# Patient Record
Sex: MALE | Race: WHITE | NOT HISPANIC OR LATINO | URBAN - METROPOLITAN AREA
[De-identification: names, ages, dates, MRNs, and addresses within clinical notes are randomized per-mention and may not be internally consistent; named-entity substitution may affect disease eponyms.]

---

## 2018-01-12 ENCOUNTER — OUTPATIENT (OUTPATIENT)
Dept: OUTPATIENT SERVICES | Facility: HOSPITAL | Age: 63
LOS: 1 days | End: 2018-01-12
Payer: COMMERCIAL

## 2018-01-12 VITALS
SYSTOLIC BLOOD PRESSURE: 109 MMHG | WEIGHT: 255.07 LBS | RESPIRATION RATE: 20 BRPM | DIASTOLIC BLOOD PRESSURE: 68 MMHG | TEMPERATURE: 98 F | HEIGHT: 72 IN | HEART RATE: 54 BPM | OXYGEN SATURATION: 96 %

## 2018-01-12 DIAGNOSIS — I35.1 NONRHEUMATIC AORTIC (VALVE) INSUFFICIENCY: ICD-10-CM

## 2018-01-12 DIAGNOSIS — R06.02 SHORTNESS OF BREATH: ICD-10-CM

## 2018-01-12 LAB
ALBUMIN SERPL ELPH-MCNC: 4 G/DL — SIGNIFICANT CHANGE UP (ref 3.3–5)
ALP SERPL-CCNC: 44 U/L — SIGNIFICANT CHANGE UP (ref 40–120)
ALT FLD-CCNC: 14 U/L RC — SIGNIFICANT CHANGE UP (ref 10–45)
ANION GAP SERPL CALC-SCNC: 7 MMOL/L — SIGNIFICANT CHANGE UP (ref 5–17)
AST SERPL-CCNC: 19 U/L — SIGNIFICANT CHANGE UP (ref 10–40)
BILIRUB SERPL-MCNC: 0.9 MG/DL — SIGNIFICANT CHANGE UP (ref 0.2–1.2)
BUN SERPL-MCNC: 25 MG/DL — HIGH (ref 7–23)
CALCIUM SERPL-MCNC: 9.2 MG/DL — SIGNIFICANT CHANGE UP (ref 8.4–10.5)
CHLORIDE SERPL-SCNC: 101 MMOL/L — SIGNIFICANT CHANGE UP (ref 96–108)
CO2 SERPL-SCNC: 31 MMOL/L — SIGNIFICANT CHANGE UP (ref 22–31)
CREAT SERPL-MCNC: 1.31 MG/DL — HIGH (ref 0.5–1.3)
GLUCOSE SERPL-MCNC: 100 MG/DL — HIGH (ref 70–99)
HCT VFR BLD CALC: 44.2 % — SIGNIFICANT CHANGE UP (ref 39–50)
HGB BLD-MCNC: 15.2 G/DL — SIGNIFICANT CHANGE UP (ref 13–17)
MCHC RBC-ENTMCNC: 31 PG — SIGNIFICANT CHANGE UP (ref 27–34)
MCHC RBC-ENTMCNC: 34.3 GM/DL — SIGNIFICANT CHANGE UP (ref 32–36)
MCV RBC AUTO: 90.4 FL — SIGNIFICANT CHANGE UP (ref 80–100)
PLATELET # BLD AUTO: 221 K/UL — SIGNIFICANT CHANGE UP (ref 150–400)
POTASSIUM SERPL-MCNC: 3.9 MMOL/L — SIGNIFICANT CHANGE UP (ref 3.5–5.3)
POTASSIUM SERPL-SCNC: 3.9 MMOL/L — SIGNIFICANT CHANGE UP (ref 3.5–5.3)
PROT SERPL-MCNC: 6.8 G/DL — SIGNIFICANT CHANGE UP (ref 6–8.3)
RBC # BLD: 4.89 M/UL — SIGNIFICANT CHANGE UP (ref 4.2–5.8)
RBC # FLD: 11.7 % — SIGNIFICANT CHANGE UP (ref 10.3–14.5)
SODIUM SERPL-SCNC: 139 MMOL/L — SIGNIFICANT CHANGE UP (ref 135–145)
WBC # BLD: 6.9 K/UL — SIGNIFICANT CHANGE UP (ref 3.8–10.5)
WBC # FLD AUTO: 6.9 K/UL — SIGNIFICANT CHANGE UP (ref 3.8–10.5)

## 2018-01-12 PROCEDURE — 85027 COMPLETE CBC AUTOMATED: CPT

## 2018-01-12 PROCEDURE — C1769: CPT

## 2018-01-12 PROCEDURE — 80053 COMPREHEN METABOLIC PANEL: CPT

## 2018-01-12 PROCEDURE — 93005 ELECTROCARDIOGRAM TRACING: CPT

## 2018-01-12 PROCEDURE — 93010 ELECTROCARDIOGRAM REPORT: CPT

## 2018-01-12 PROCEDURE — 93458 L HRT ARTERY/VENTRICLE ANGIO: CPT

## 2018-01-12 PROCEDURE — C1887: CPT

## 2018-01-12 PROCEDURE — 93567 NJX CAR CTH SPRVLV AORTGRPHY: CPT

## 2018-01-12 PROCEDURE — C1894: CPT

## 2018-01-12 RX ORDER — SODIUM CHLORIDE 9 MG/ML
3 INJECTION INTRAMUSCULAR; INTRAVENOUS; SUBCUTANEOUS EVERY 8 HOURS
Qty: 0 | Refills: 0 | Status: DISCONTINUED | OUTPATIENT
Start: 2018-01-12 | End: 2018-01-27

## 2018-01-12 RX ORDER — ETANERCEPT 25 MG
50 VIAL (EA) SUBCUTANEOUS
Qty: 0 | Refills: 0 | COMMUNITY

## 2018-01-12 RX ORDER — LISINOPRIL 2.5 MG/1
1 TABLET ORAL
Qty: 0 | Refills: 0 | COMMUNITY

## 2018-01-12 RX ORDER — ASPIRIN/CALCIUM CARB/MAGNESIUM 324 MG
1 TABLET ORAL
Qty: 0 | Refills: 0 | COMMUNITY

## 2018-01-12 RX ORDER — ATORVASTATIN CALCIUM 80 MG/1
1 TABLET, FILM COATED ORAL
Qty: 0 | Refills: 0 | COMMUNITY

## 2018-01-12 NOTE — H&P CARDIOLOGY - HISTORY OF PRESENT ILLNESS
y/o  male with PMHx of HTN, HLD, Psoriasis, now  MPI done on 12/18/17 reveals small- mod size mild - mod intensity partially reversible perfusion defect involving the left ventricular cavity echo d one on 12/18/17 demonstrates Normal LV size & funx, Grade I diastolic dysfunction, LVH, mild AR, atrial septal aneurysm, dilated AR & ascending aorta, seen & evaluated by cardiologist & now recommends for cardiac cath. 61y/o  male with PMHx of HTN, HLD, Psoriasis, now  presents with c/o Dizziness, denies chest pain, dyspnea s/p MPI done on 12/18/17 reveals small- mod size mild - mod intensity partially reversible perfusion defect involving the left ventricular cavity echo d one on 12/18/17 demonstrates Normal LV size & funx, Grade I diastolic dysfunction, LVH, mild AR, atrial septal aneurysm, dilated AR & ascending aorta, seen & evaluated by cardiologist Dr Stubbs who now recommends for cardiac cath.

## 2018-01-12 NOTE — H&P CARDIOLOGY - PSH
Bilat Ing Hernia repair 1993 1990  History of Arthroscopy - right shoulder - rotator cuff repair in 2002  right 2003  left hand surgery in 1979  left torn ligaments 1979, removal of overgrowth of muscle 2013  S/P bunionectomy  right 2010  S/P tonsillectomy and adenoidectomy  childhood

## 2018-07-17 ENCOUNTER — APPOINTMENT (OUTPATIENT)
Dept: VASCULAR SURGERY | Facility: CLINIC | Age: 63
End: 2018-07-17
Payer: COMMERCIAL

## 2018-07-17 VITALS
HEIGHT: 72 IN | BODY MASS INDEX: 33.18 KG/M2 | SYSTOLIC BLOOD PRESSURE: 121 MMHG | HEART RATE: 58 BPM | WEIGHT: 245 LBS | DIASTOLIC BLOOD PRESSURE: 73 MMHG | TEMPERATURE: 98 F

## 2018-07-17 DIAGNOSIS — Z86.39 PERSONAL HISTORY OF OTHER ENDOCRINE, NUTRITIONAL AND METABOLIC DISEASE: ICD-10-CM

## 2018-07-17 PROBLEM — I71.9 AORTIC ANEURYSM OF UNSPECIFIED SITE, WITHOUT RUPTURE: Chronic | Status: ACTIVE | Noted: 2018-01-12

## 2018-07-17 PROCEDURE — 99203 OFFICE O/P NEW LOW 30 MIN: CPT

## 2018-07-17 PROCEDURE — 93970 EXTREMITY STUDY: CPT

## 2018-07-17 RX ORDER — LISINOPRIL 30 MG/1
TABLET ORAL
Refills: 0 | Status: ACTIVE | COMMUNITY

## 2018-09-27 ENCOUNTER — APPOINTMENT (OUTPATIENT)
Dept: PULMONOLOGY | Facility: CLINIC | Age: 63
End: 2018-09-27
Payer: COMMERCIAL

## 2018-09-27 VITALS
TEMPERATURE: 98.6 F | DIASTOLIC BLOOD PRESSURE: 70 MMHG | HEART RATE: 62 BPM | BODY MASS INDEX: 33.18 KG/M2 | WEIGHT: 245 LBS | OXYGEN SATURATION: 96 % | SYSTOLIC BLOOD PRESSURE: 107 MMHG | HEIGHT: 72 IN | RESPIRATION RATE: 14 BRPM

## 2018-09-27 PROCEDURE — 99214 OFFICE O/P EST MOD 30 MIN: CPT

## 2019-06-25 ENCOUNTER — APPOINTMENT (OUTPATIENT)
Dept: PULMONOLOGY | Facility: CLINIC | Age: 64
End: 2019-06-25
Payer: COMMERCIAL

## 2019-06-25 VITALS
SYSTOLIC BLOOD PRESSURE: 140 MMHG | OXYGEN SATURATION: 96 % | HEIGHT: 72 IN | BODY MASS INDEX: 33.18 KG/M2 | HEART RATE: 66 BPM | WEIGHT: 245 LBS | DIASTOLIC BLOOD PRESSURE: 90 MMHG | RESPIRATION RATE: 16 BRPM

## 2019-06-25 VITALS
BODY MASS INDEX: 33.18 KG/M2 | HEART RATE: 56 BPM | SYSTOLIC BLOOD PRESSURE: 130 MMHG | RESPIRATION RATE: 16 BRPM | OXYGEN SATURATION: 97 % | WEIGHT: 245 LBS | TEMPERATURE: 98.2 F | HEIGHT: 72 IN | DIASTOLIC BLOOD PRESSURE: 81 MMHG

## 2019-06-25 LAB
ALBUMIN: 30
BILIRUB UR QL STRIP: NEGATIVE
CLARITY UR: CLEAR
COLLECTION METHOD: NORMAL
CREATININE: 200
GLUCOSE UR-MCNC: NEGATIVE
HCG UR QL: 0.2 EU/DL
HGB UR QL STRIP.AUTO: NEGATIVE
KETONES UR-MCNC: NEGATIVE
LEUKOCYTE ESTERASE UR QL STRIP: NEGATIVE
MICROALBUMIN/CREAT UR TEST STR-RTO: <30
NITRITE UR QL STRIP: NEGATIVE
PH UR STRIP: 6.5
PROT UR STRIP-MCNC: NEGATIVE
SP GR UR STRIP: 1.02

## 2019-06-25 PROCEDURE — 36415 COLL VENOUS BLD VENIPUNCTURE: CPT

## 2019-06-25 PROCEDURE — 81003 URINALYSIS AUTO W/O SCOPE: CPT | Mod: QW

## 2019-06-25 PROCEDURE — 71046 X-RAY EXAM CHEST 2 VIEWS: CPT

## 2019-06-25 PROCEDURE — 94729 DIFFUSING CAPACITY: CPT

## 2019-06-25 PROCEDURE — 82044 UR ALBUMIN SEMIQUANTITATIVE: CPT | Mod: QW

## 2019-06-25 PROCEDURE — 88738 HGB QUANT TRANSCUTANEOUS: CPT

## 2019-06-25 PROCEDURE — 94727 GAS DIL/WSHOT DETER LNG VOL: CPT

## 2019-06-25 PROCEDURE — 94060 EVALUATION OF WHEEZING: CPT

## 2019-06-25 PROCEDURE — 99396 PREV VISIT EST AGE 40-64: CPT | Mod: 25

## 2019-06-25 PROCEDURE — 93000 ELECTROCARDIOGRAM COMPLETE: CPT

## 2019-06-25 RX ORDER — ASPIRIN 81 MG
81 TABLET, DELAYED RELEASE (ENTERIC COATED) ORAL
Refills: 0 | Status: DISCONTINUED | COMMUNITY
End: 2019-06-25

## 2019-06-25 RX ORDER — MELOXICAM 15 MG/1
15 TABLET ORAL
Qty: 30 | Refills: 1 | Status: DISCONTINUED | COMMUNITY
Start: 2018-09-27 | End: 2019-06-25

## 2019-06-25 RX ORDER — CERTOLIZUMAB PEGOL 200 MG/ML
2 X 200 INJECTION, SOLUTION SUBCUTANEOUS
Refills: 0 | Status: ACTIVE | COMMUNITY
Start: 2019-06-25

## 2019-06-26 LAB
25(OH)D3 SERPL-MCNC: 28.5 NG/ML
ALBUMIN SERPL ELPH-MCNC: 4.3 G/DL
ALP BLD-CCNC: 49 U/L
ALT SERPL-CCNC: 15 U/L
ANION GAP SERPL CALC-SCNC: 13 MMOL/L
AST SERPL-CCNC: 18 U/L
BASOPHILS # BLD AUTO: 0.05 K/UL
BASOPHILS NFR BLD AUTO: 0.7 %
BILIRUB SERPL-MCNC: 0.9 MG/DL
BUN SERPL-MCNC: 21 MG/DL
CALCIUM SERPL-MCNC: 9.3 MG/DL
CHLORIDE SERPL-SCNC: 103 MMOL/L
CHOLEST SERPL-MCNC: 170 MG/DL
CHOLEST/HDLC SERPL: 4.1 RATIO
CO2 SERPL-SCNC: 25 MMOL/L
CREAT SERPL-MCNC: 1.25 MG/DL
EOSINOPHIL # BLD AUTO: 0.24 K/UL
EOSINOPHIL NFR BLD AUTO: 3.5 %
ESTIMATED AVERAGE GLUCOSE: 114 MG/DL
GLUCOSE SERPL-MCNC: 94 MG/DL
HBA1C MFR BLD HPLC: 5.6 %
HCT VFR BLD CALC: 45.8 %
HCV AB SER QL: NONREACTIVE
HCV S/CO RATIO: 0.1 S/CO
HDLC SERPL-MCNC: 42 MG/DL
HGB BLD-MCNC: 14.8 G/DL
IMM GRANULOCYTES NFR BLD AUTO: 0.1 %
LDLC SERPL CALC-MCNC: 101 MG/DL
LYMPHOCYTES # BLD AUTO: 1.57 K/UL
LYMPHOCYTES NFR BLD AUTO: 23.1 %
MAN DIFF?: NORMAL
MCHC RBC-ENTMCNC: 29.4 PG
MCHC RBC-ENTMCNC: 32.3 GM/DL
MCV RBC AUTO: 91.1 FL
MONOCYTES # BLD AUTO: 0.46 K/UL
MONOCYTES NFR BLD AUTO: 6.8 %
NEUTROPHILS # BLD AUTO: 4.46 K/UL
NEUTROPHILS NFR BLD AUTO: 65.8 %
PLATELET # BLD AUTO: 227 K/UL
POTASSIUM SERPL-SCNC: 4 MMOL/L
PROT SERPL-MCNC: 6.6 G/DL
PSA SERPL-MCNC: 0.91 NG/ML
RBC # BLD: 5.03 M/UL
RBC # FLD: 13.8 %
SODIUM SERPL-SCNC: 141 MMOL/L
T4 FREE SERPL-MCNC: 1.3 NG/DL
T4 SERPL-MCNC: 6.6 UG/DL
TRIGL SERPL-MCNC: 137 MG/DL
TSH SERPL-ACNC: 2.25 UIU/ML
WBC # FLD AUTO: 6.79 K/UL

## 2019-06-27 LAB — ANA SER IF-ACNC: NEGATIVE

## 2019-06-27 NOTE — DISCUSSION/SUMMARY
[FreeTextEntry1] : Well Visit\par  HTN\par HLD\par JENN on CPAP\par psoriasis \par Male well visit blood work pending\par Patient compliant with CPAP therapy.  Continue present settings.  Patient with demonstrated clinical benefit with daytime and nocturnal symptomatology improvement.\par New medications\par With no evidence for chronic cough will continue lisinopril

## 2019-06-27 NOTE — HISTORY OF PRESENT ILLNESS
[Stable] : are stable [Cough] : denies coughing [Feelings Of Weakness On Exertion] : denies exercise intolerance [Difficulty Breathing During Exertion] : denies dyspnea on exertion [Wheezing] : denies wheezing [Chest Pain Or Discomfort] : denies chest pain [Regional Soft Tissue Swelling Both Lower Extremities] : denies lower extremity edema [Fever] : denies fever [Obstructive Sleep Apnea] : obstructive sleep apnea [AHI: ___ per hour] : Apnea-hypopnea index:  [unfilled] per hour [Date: ___] : Date of most recent diagnostic polysomnogram: [unfilled] [Wt Gain ___ Lbs] : no recent weight gain [Wt Loss ___ Lbs] : no recent weight loss [Oxygen] : the patient uses no supplemental oxygen [FreeTextEntry1] : history [facility] [employer]\par s/p cough but resolved\par Colonoscopy up  to date Jan 2018\par

## 2019-06-27 NOTE — PROCEDURE
[FreeTextEntry1] : Blood draw\par Urinalysis noted negative\par PFT June 25, 2019\par Spirometry normal\par 32% response to bronchodilator at small airways\par Lung volumes normal with total lung capacity 97% predicted.\par Diffusion normal 108% of predicted.\par Hemoglobin 14.9\par CPAP data compliance review through June 24, 2019\par Usage 90%\par Hours greater than 6\par AHI 4.0\par Auto CPAP settings 6-20 cm H2O\par Chest x-ray PA lateral June 25, 2019\par Cardiac size normal\par Clear lung fields without evidence of parenchymal infiltrates pleural effusions dominant pulmonary nodules\par No pneumothorax\par Mediastinum hilum normal\par No interval change compared to a chest x-ray dating back to December 7, 2017\par Blood draw\par Data review 6/26/2019\par CBC White blood count 6.79 hemoglobin 14.8 hematocrit 45.8 platelet count 227,000\par TFTs normal\par Hemoglobin A1c 5.6 with mean plasma glucose 114\par Hepatitis C titer negative\par Lipid profile\par Cholesterol 170 HDL 42  triglycerides 137\par PSA 0.91\par Vitamin D 28.5\par Serum electrolytes normal\par Liver function testing normal range\par DEJAH negative\par

## 2019-06-27 NOTE — REVIEW OF SYSTEMS
[As Noted in HPI] : as noted in HPI [Negative] : HEENT [FreeTextEntry9] : HLD History Left Bundle Branch Block

## 2019-06-27 NOTE — REASON FOR VISIT
[Follow-Up] : a follow-up visit [Sleep Apnea] : sleep apnea [FreeTextEntry2] : Well Visit, HTN , S/P cough

## 2019-06-27 NOTE — PHYSICAL EXAM
[General Appearance - Well Developed] : well developed [Normal Appearance] : normal appearance [Well Groomed] : well groomed [No Deformities] : no deformities [General Appearance - Well Nourished] : well nourished [Eyelids - No Xanthelasma] : the eyelids demonstrated no xanthelasmas [General Appearance - In No Acute Distress] : no acute distress [Normal Conjunctiva] : the conjunctiva exhibited no abnormalities [Neck Appearance] : the appearance of the neck was normal [I] : I [Jugular Venous Distention Increased] : there was no jugular-venous distention [Neck Cervical Mass (___cm)] : no neck mass was observed [Heart Rate And Rhythm] : heart rate and rhythm were normal [Heart Sounds] : normal S1 and S2 [Thyroid Diffuse Enlargement] : the thyroid was not enlarged [Arterial Pulses Normal] : the arterial pulses were normal [Murmurs] : no murmurs present [Edema] : no peripheral edema present [Veins - Varicosity Changes] : no varicosital changes were noted in the lower extremities [Respiration, Rhythm And Depth] : normal respiratory rhythm and effort [Exaggerated Use Of Accessory Muscles For Inspiration] : no accessory muscle use [Auscultation Breath Sounds / Voice Sounds] : lungs were clear to auscultation bilaterally [Chest Palpation] : palpation of the chest revealed no abnormalities [Lungs Percussion] : the lungs were normal to percussion [Bowel Sounds] : normal bowel sounds [Abdomen Soft] : soft [Abdomen Tenderness] : non-tender [Abdomen Mass (___ Cm)] : no abdominal mass palpated [Cyanosis, Localized] : no localized cyanosis [Nail Clubbing] : no clubbing of the fingernails [] : no ischemic changes [Petechial Hemorrhages (___cm)] : no petechial hemorrhages [Deep Tendon Reflexes (DTR)] : deep tendon reflexes were 2+ and symmetric [No Focal Deficits] : no focal deficits [Sensation] : the sensory exam was normal to light touch and pinprick [Oriented To Time, Place, And Person] : oriented to person, place, and time [Affect] : the affect was normal [Impaired Insight] : insight and judgment were intact [Mood] : the mood was normal [FreeTextEntry1] : non icteric

## 2019-07-01 ENCOUNTER — RESULT CHARGE (OUTPATIENT)
Age: 64
End: 2019-07-01

## 2019-07-10 ENCOUNTER — TRANSCRIPTION ENCOUNTER (OUTPATIENT)
Age: 64
End: 2019-07-10

## 2019-08-30 NOTE — H&P CARDIOLOGY - HEIGHT IN CM
Solaraze Counseling:  I discussed with the patient the risks of Solaraze including but not limited to erythema, scaling, itching, weeping, crusting, and pain. 182.88

## 2019-10-17 ENCOUNTER — RESULT REVIEW (OUTPATIENT)
Age: 64
End: 2019-10-17

## 2020-11-02 ENCOUNTER — LABORATORY RESULT (OUTPATIENT)
Age: 65
End: 2020-11-02

## 2020-11-02 ENCOUNTER — NON-APPOINTMENT (OUTPATIENT)
Age: 65
End: 2020-11-02

## 2020-11-02 ENCOUNTER — APPOINTMENT (OUTPATIENT)
Dept: PULMONOLOGY | Facility: CLINIC | Age: 65
End: 2020-11-02
Payer: COMMERCIAL

## 2020-11-02 VITALS
DIASTOLIC BLOOD PRESSURE: 80 MMHG | SYSTOLIC BLOOD PRESSURE: 134 MMHG | WEIGHT: 245 LBS | HEART RATE: 63 BPM | HEIGHT: 72 IN | RESPIRATION RATE: 17 BRPM | BODY MASS INDEX: 33.18 KG/M2 | OXYGEN SATURATION: 97 % | TEMPERATURE: 97.3 F

## 2020-11-02 DIAGNOSIS — L40.9 PSORIASIS, UNSPECIFIED: ICD-10-CM

## 2020-11-02 DIAGNOSIS — H53.149 VISUAL DISCOMFORT, UNSPECIFIED: ICD-10-CM

## 2020-11-02 DIAGNOSIS — R94.31 ABNORMAL ELECTROCARDIOGRAM [ECG] [EKG]: ICD-10-CM

## 2020-11-02 DIAGNOSIS — R51.9 HEADACHE, UNSPECIFIED: ICD-10-CM

## 2020-11-02 DIAGNOSIS — Z00.00 ENCOUNTER FOR GENERAL ADULT MEDICAL EXAMINATION W/OUT ABNORMAL FINDINGS: ICD-10-CM

## 2020-11-02 DIAGNOSIS — N52.9 MALE ERECTILE DYSFUNCTION, UNSPECIFIED: ICD-10-CM

## 2020-11-02 DIAGNOSIS — Z23 ENCOUNTER FOR IMMUNIZATION: ICD-10-CM

## 2020-11-02 LAB
ALBUMIN: 10
BILIRUB UR QL STRIP: NORMAL
CLARITY UR: CLEAR
COLLECTION METHOD: NORMAL
CREATININE: 100
GLUCOSE UR-MCNC: NORMAL
HCG UR QL: 0.2 EU/DL
HGB UR QL STRIP.AUTO: NORMAL
KETONES UR-MCNC: NORMAL
LEUKOCYTE ESTERASE UR QL STRIP: NORMAL
MICROALBUMIN/CREAT UR TEST STR-RTO: 30
NITRITE UR QL STRIP: NORMAL
PH UR STRIP: 6.5
PROT UR STRIP-MCNC: NORMAL
SP GR UR STRIP: 1.02

## 2020-11-02 PROCEDURE — G0008: CPT

## 2020-11-02 PROCEDURE — 82270 OCCULT BLOOD FECES: CPT

## 2020-11-02 PROCEDURE — 81003 URINALYSIS AUTO W/O SCOPE: CPT | Mod: QW

## 2020-11-02 PROCEDURE — 90686 IIV4 VACC NO PRSV 0.5 ML IM: CPT

## 2020-11-02 PROCEDURE — 82044 UR ALBUMIN SEMIQUANTITATIVE: CPT | Mod: QW

## 2020-11-02 PROCEDURE — 99396 PREV VISIT EST AGE 40-64: CPT | Mod: 25

## 2020-11-02 PROCEDURE — 93000 ELECTROCARDIOGRAM COMPLETE: CPT

## 2020-11-02 PROCEDURE — 36415 COLL VENOUS BLD VENIPUNCTURE: CPT

## 2020-11-02 PROCEDURE — 71046 X-RAY EXAM CHEST 2 VIEWS: CPT

## 2020-11-02 PROCEDURE — 99072 ADDL SUPL MATRL&STAF TM PHE: CPT

## 2020-11-02 NOTE — HISTORY OF PRESENT ILLNESS
[Never] : never [Stable] : are stable [Difficulty Breathing During Exertion] : denies dyspnea on exertion [Feelings Of Weakness On Exertion] : denies exercise intolerance [Cough] : denies coughing [Wheezing] : denies wheezing [Regional Soft Tissue Swelling Both Lower Extremities] : denies lower extremity edema [Chest Pain Or Discomfort] : denies chest pain [Fever] : denies fever [Obstructive Sleep Apnea] : obstructive sleep apnea [Date: ___] : Date of most recent diagnostic polysomnogram: [unfilled] [AHI: ___ per hour] : Apnea-hypopnea index:  [unfilled] per hour [Wt Gain ___ Lbs] : no recent weight gain [Wt Loss ___ Lbs] : no recent weight loss [Oxygen] : the patient uses no supplemental oxygen [FreeTextEntry1] : Chief complaint states he gets intermittent but more frequent headaches with some associated visual disturbances\par States headaches are usually posterior occipital in region\par No associated nausea or vomiting reported\par Not prescribed any photosensitivity\par \par history [facility] [employer]\par No current cough\par Colonoscopy up  to date Jan 2018\par

## 2020-11-02 NOTE — PROCEDURE
[FreeTextEntry1] : Blood draw\par Urinalysis noted \par \par Chest  x-ray PA lateral November 2, 2020\par Uncoiled aorta\par Grossly normal cardiac size\par Lung fields are grossly clear without parenchymal infiltrates pleural effusions dominant pulmonary nodules\par Soft tissue bony structures grossly unremarkable\par Ashlee mediastinum unremarkable\par No gross interval change compared to chest x-ray of June 25, 2019\par \par PFT June 25, 2019\par Spirometry normal\par 32% response to bronchodilator at small airways\par Lung volumes normal with total lung capacity 97% predicted.\par Diffusion normal 108% of predicted.\par Hemoglobin 14.9\par CPAP data compliance review through June 24, 2019\par Usage 90%\par Hours greater than 6\par AHI 4.0\par Auto CPAP settings 6-20 cm H2O\par Chest x-ray PA lateral June 25, 2019\par Cardiac size normal\par Clear lung fields without evidence of parenchymal infiltrates pleural effusions dominant pulmonary nodules\par No pneumothorax\par Mediastinum hilum normal\par No interval change compared to a chest x-ray dating back to December 7, 2017\par Blood draw\par Data review 6/26/2019\par CBC White blood count 6.79 hemoglobin 14.8 hematocrit 45.8 platelet count 227,000\par TFTs normal\par Hemoglobin A1c 5.6 with mean plasma glucose 114\par Hepatitis C titer negative\par Lipid profile\par Cholesterol 170 HDL 42  triglycerides 137\par PSA 0.91\par Vitamin D 28.5\par Serum electrolytes normal\par Liver function testing normal range\par DEJAH negative\par

## 2020-11-02 NOTE — PHYSICAL EXAM
[General Appearance - Well Developed] : well developed [Normal Appearance] : normal appearance [Well Groomed] : well groomed [General Appearance - Well Nourished] : well nourished [No Deformities] : no deformities [General Appearance - In No Acute Distress] : no acute distress [Normal Conjunctiva] : the conjunctiva exhibited no abnormalities [Eyelids - No Xanthelasma] : the eyelids demonstrated no xanthelasmas [I] : I [Neck Appearance] : the appearance of the neck was normal [Neck Cervical Mass (___cm)] : no neck mass was observed [Jugular Venous Distention Increased] : there was no jugular-venous distention [Thyroid Diffuse Enlargement] : the thyroid was not enlarged [Heart Rate And Rhythm] : heart rate and rhythm were normal [Heart Sounds] : normal S1 and S2 [Murmurs] : no murmurs present [Arterial Pulses Normal] : the arterial pulses were normal [Edema] : no peripheral edema present [Veins - Varicosity Changes] : no varicosital changes were noted in the lower extremities [Respiration, Rhythm And Depth] : normal respiratory rhythm and effort [Exaggerated Use Of Accessory Muscles For Inspiration] : no accessory muscle use [Auscultation Breath Sounds / Voice Sounds] : lungs were clear to auscultation bilaterally [Chest Palpation] : palpation of the chest revealed no abnormalities [Lungs Percussion] : the lungs were normal to percussion [Bowel Sounds] : normal bowel sounds [Abdomen Soft] : soft [Abdomen Tenderness] : non-tender [Abdomen Mass (___ Cm)] : no abdominal mass palpated [Nail Clubbing] : no clubbing of the fingernails [Cyanosis, Localized] : no localized cyanosis [Petechial Hemorrhages (___cm)] : no petechial hemorrhages [] : no ischemic changes [Deep Tendon Reflexes (DTR)] : deep tendon reflexes were 2+ and symmetric [Sensation] : the sensory exam was normal to light touch and pinprick [No Focal Deficits] : no focal deficits [Oriented To Time, Place, And Person] : oriented to person, place, and time [Impaired Insight] : insight and judgment were intact [Affect] : the affect was normal [Mood] : the mood was normal [FreeTextEntry1] : non icteric

## 2020-11-02 NOTE — DISCUSSION/SUMMARY
[FreeTextEntry1] : Well Visit\par Headaches with predominant  occipital pain discomfort neurology/brain MRI with associated visual disturbance-\par  HTN\par HLD\par JENN on CPAP\par psoriasis \par Male well visit blood work pending\par Patient compliant with CPAP therapy.  Continue present settings.  Patient with demonstrated clinical benefit with daytime and nocturnal symptomatology improvement.\par With no evidence for chronic cough will continue lisinopril\par JaN 2018 Colonoscopy\par Labs  fax DERM Sheila Cheney MD Fax #W  [phone]\par NEURO Dr Francis

## 2020-11-03 ENCOUNTER — NON-APPOINTMENT (OUTPATIENT)
Age: 65
End: 2020-11-03

## 2020-11-03 LAB
25(OH)D3 SERPL-MCNC: 30.1 NG/ML
ALBUMIN SERPL ELPH-MCNC: 4.6 G/DL
ALP BLD-CCNC: 65 U/L
ALT SERPL-CCNC: 15 U/L
ANION GAP SERPL CALC-SCNC: 12 MMOL/L
AST SERPL-CCNC: 19 U/L
BASOPHILS # BLD AUTO: 0.07 K/UL
BASOPHILS NFR BLD AUTO: 0.8 %
BILIRUB SERPL-MCNC: 1.2 MG/DL
BUN SERPL-MCNC: 15 MG/DL
CALCIUM SERPL-MCNC: 9.5 MG/DL
CHLORIDE SERPL-SCNC: 102 MMOL/L
CHOLEST SERPL-MCNC: 257 MG/DL
CO2 SERPL-SCNC: 25 MMOL/L
CREAT SERPL-MCNC: 1.25 MG/DL
EOSINOPHIL # BLD AUTO: 0.28 K/UL
EOSINOPHIL NFR BLD AUTO: 3.3 %
ERYTHROCYTE [SEDIMENTATION RATE] IN BLOOD BY WESTERGREN METHOD: 8 MM/HR
ESTIMATED AVERAGE GLUCOSE: 108 MG/DL
GLUCOSE SERPL-MCNC: 97 MG/DL
HBA1C MFR BLD HPLC: 5.4 %
HBV SURFACE AB SER QL: NONREACTIVE
HCT VFR BLD CALC: 49.1 %
HCV AB SER QL: NONREACTIVE
HCV S/CO RATIO: 0.13 S/CO
HDLC SERPL-MCNC: 45 MG/DL
HGB BLD-MCNC: 16.3 G/DL
IMM GRANULOCYTES NFR BLD AUTO: 0.2 %
LDLC SERPL CALC-MCNC: 181 MG/DL
LYMPHOCYTES # BLD AUTO: 1.65 K/UL
LYMPHOCYTES NFR BLD AUTO: 19.4 %
MAN DIFF?: NORMAL
MCHC RBC-ENTMCNC: 29.3 PG
MCHC RBC-ENTMCNC: 33.2 GM/DL
MCV RBC AUTO: 88.3 FL
MONOCYTES # BLD AUTO: 0.69 K/UL
MONOCYTES NFR BLD AUTO: 8.1 %
NEUTROPHILS # BLD AUTO: 5.79 K/UL
NEUTROPHILS NFR BLD AUTO: 68.2 %
NONHDLC SERPL-MCNC: 212 MG/DL
PLATELET # BLD AUTO: 258 K/UL
POTASSIUM SERPL-SCNC: 4.1 MMOL/L
PROT SERPL-MCNC: 7.1 G/DL
PSA SERPL-MCNC: 1.01 NG/ML
RBC # BLD: 5.56 M/UL
RBC # FLD: 13.2 %
SODIUM SERPL-SCNC: 139 MMOL/L
T3 SERPL-MCNC: 100 NG/DL
T4 FREE SERPL-MCNC: 1.4 NG/DL
T4 SERPL-MCNC: 7.4 UG/DL
TRIGL SERPL-MCNC: 155 MG/DL
TSH SERPL-ACNC: 2.31 UIU/ML
WBC # FLD AUTO: 8.5 K/UL

## 2020-11-04 LAB
M TB IFN-G BLD-IMP: NEGATIVE
QUANTIFERON TB PLUS MITOGEN MINUS NIL: 6.64 IU/ML
QUANTIFERON TB PLUS NIL: 0.01 IU/ML
QUANTIFERON TB PLUS TB1 MINUS NIL: 0.01 IU/ML
QUANTIFERON TB PLUS TB2 MINUS NIL: 0.01 IU/ML

## 2020-11-05 LAB
ANA PAT FLD IF-IMP: ABNORMAL
ANACR T: ABNORMAL

## 2020-11-09 ENCOUNTER — RESULT CHARGE (OUTPATIENT)
Age: 65
End: 2020-11-09

## 2021-01-25 DIAGNOSIS — Z20.822 CONTACT WITH AND (SUSPECTED) EXPOSURE TO COVID-19: ICD-10-CM

## 2021-02-01 ENCOUNTER — APPOINTMENT (OUTPATIENT)
Dept: PULMONOLOGY | Facility: CLINIC | Age: 66
End: 2021-02-01

## 2021-03-02 ENCOUNTER — APPOINTMENT (OUTPATIENT)
Dept: DISASTER EMERGENCY | Facility: CLINIC | Age: 66
End: 2021-03-02

## 2021-03-08 ENCOUNTER — APPOINTMENT (OUTPATIENT)
Dept: PULMONOLOGY | Facility: CLINIC | Age: 66
End: 2021-03-08

## 2021-06-03 ENCOUNTER — APPOINTMENT (OUTPATIENT)
Dept: PULMONOLOGY | Facility: CLINIC | Age: 66
End: 2021-06-03
Payer: COMMERCIAL

## 2021-06-03 VITALS
HEART RATE: 67 BPM | SYSTOLIC BLOOD PRESSURE: 135 MMHG | TEMPERATURE: 98.1 F | OXYGEN SATURATION: 97 % | DIASTOLIC BLOOD PRESSURE: 85 MMHG

## 2021-06-03 PROCEDURE — 99214 OFFICE O/P EST MOD 30 MIN: CPT | Mod: 25

## 2021-06-03 PROCEDURE — 90715 TDAP VACCINE 7 YRS/> IM: CPT

## 2021-06-03 PROCEDURE — 90471 IMMUNIZATION ADMIN: CPT

## 2021-06-03 NOTE — HISTORY OF PRESENT ILLNESS
[Never] : never [Stable] : are stable [Difficulty Breathing During Exertion] : denies dyspnea on exertion [Feelings Of Weakness On Exertion] : denies exercise intolerance [Cough] : denies coughing [Wheezing] : denies wheezing [Regional Soft Tissue Swelling Both Lower Extremities] : denies lower extremity edema [Chest Pain Or Discomfort] : denies chest pain [Fever] : denies fever [Obstructive Sleep Apnea] : obstructive sleep apnea [Date: ___] : Date of most recent diagnostic polysomnogram: [unfilled] [AHI: ___ per hour] : Apnea-hypopnea index:  [unfilled] per hour [Wt Gain ___ Lbs] : no recent weight gain [Wt Loss ___ Lbs] : no recent weight loss [Oxygen] : the patient uses no supplemental oxygen [FreeTextEntry1] : [facility] with [facility]\par  Visit Dr Delgado - managed Lipid \par remains on CPAP\par \par history [facility] [employer]\par No current cough\par Colonoscopy up  to date Jan 2018\par

## 2021-06-03 NOTE — DISCUSSION/SUMMARY
[FreeTextEntry1] : Well Visit Nov 2020\par Headaches with predominant  occipital pain discomfort neurology/brain MRI with associated visual disturbance-\par  HTN\par HLD\par JENN on CPAP\par psoriasis \par Patient compliant with CPAP therapy.  Continue present settings.  Patient with demonstrated clinical benefit with daytime and nocturnal symptomatology improvement.\par With no evidence for chronic cough will continue lisinopril-inc 10 mg\par JaN 2018 Colonoscopy\par Labs  fax DERM Sheila Cheney MD Fax #W  [phone]\par NEURO Dr Francis\par  TDAP

## 2021-06-03 NOTE — PROCEDURE
[FreeTextEntry1] : \par \par Chest  x-ray PA lateral November 2, 2020\par Uncoiled aorta\par Grossly normal cardiac size\par Lung fields are grossly clear without parenchymal infiltrates pleural effusions dominant pulmonary nodules\par Soft tissue bony structures grossly unremarkable\par Ashlee mediastinum unremarkable\par No gross interval change compared to chest x-ray of June 25, 2019\par \par PFT June 25, 2019\par Spirometry normal\par 32% response to bronchodilator at small airways\par Lung volumes normal with total lung capacity 97% predicted.\par Diffusion normal 108% of predicted.\par Hemoglobin 14.9\par CPAP data compliance review through June 24, 2019\par Usage 90%\par Hours greater than 6\par AHI 4.0\par Auto CPAP settings 6-20 cm H2O\par Chest x-ray PA lateral June 25, 2019\par Cardiac size normal\par Clear lung fields without evidence of parenchymal infiltrates pleural effusions dominant pulmonary nodules\par No pneumothorax\par Mediastinum hilum normal\par No interval change compared to a chest x-ray dating back to December 7, 2017\par Blood draw\par Data review 6/26/2019\par CBC White blood count 6.79 hemoglobin 14.8 hematocrit 45.8 platelet count 227,000\par TFTs normal\par Hemoglobin A1c 5.6 with mean plasma glucose 114\par Hepatitis C titer negative\par Lipid profile\par Cholesterol 170 HDL 42  triglycerides 137\par PSA 0.91\par Vitamin D 28.5\par Serum electrolytes normal\par Liver function testing normal range\par DEJAH negative\par

## 2021-09-02 ENCOUNTER — APPOINTMENT (OUTPATIENT)
Dept: PULMONOLOGY | Facility: CLINIC | Age: 66
End: 2021-09-02

## 2021-09-17 ENCOUNTER — APPOINTMENT (OUTPATIENT)
Dept: CT IMAGING | Facility: CLINIC | Age: 66
End: 2021-09-17
Payer: COMMERCIAL

## 2021-09-17 ENCOUNTER — OUTPATIENT (OUTPATIENT)
Dept: OUTPATIENT SERVICES | Facility: HOSPITAL | Age: 66
LOS: 1 days | End: 2021-09-17
Payer: COMMERCIAL

## 2021-09-17 ENCOUNTER — RESULT REVIEW (OUTPATIENT)
Age: 66
End: 2021-09-17

## 2021-09-17 DIAGNOSIS — D37.02 NEOPLASM OF UNCERTAIN BEHAVIOR OF TONGUE: ICD-10-CM

## 2021-09-17 PROCEDURE — 70491 CT SOFT TISSUE NECK W/DYE: CPT | Mod: 26

## 2021-09-17 PROCEDURE — 82565 ASSAY OF CREATININE: CPT

## 2021-09-17 PROCEDURE — 70491 CT SOFT TISSUE NECK W/DYE: CPT

## 2021-09-20 ENCOUNTER — NON-APPOINTMENT (OUTPATIENT)
Age: 66
End: 2021-09-20

## 2021-09-30 ENCOUNTER — APPOINTMENT (OUTPATIENT)
Dept: PULMONOLOGY | Facility: CLINIC | Age: 66
End: 2021-09-30
Payer: COMMERCIAL

## 2021-09-30 VITALS
HEIGHT: 72 IN | DIASTOLIC BLOOD PRESSURE: 85 MMHG | SYSTOLIC BLOOD PRESSURE: 143 MMHG | TEMPERATURE: 98 F | BODY MASS INDEX: 33.86 KG/M2 | OXYGEN SATURATION: 96 % | WEIGHT: 250 LBS | HEART RATE: 70 BPM | RESPIRATION RATE: 16 BRPM

## 2021-09-30 DIAGNOSIS — Z01.818 ENCOUNTER FOR OTHER PREPROCEDURAL EXAMINATION: ICD-10-CM

## 2021-09-30 DIAGNOSIS — E78.5 HYPERLIPIDEMIA, UNSPECIFIED: ICD-10-CM

## 2021-09-30 PROCEDURE — 93000 ELECTROCARDIOGRAM COMPLETE: CPT

## 2021-09-30 PROCEDURE — 99214 OFFICE O/P EST MOD 30 MIN: CPT | Mod: 25

## 2021-09-30 PROCEDURE — 71046 X-RAY EXAM CHEST 2 VIEWS: CPT

## 2021-10-01 LAB
APTT BLD: 34.6 SEC
INR PPP: 0.98 RATIO
PREALB SERPL NEPH-MCNC: 31 MG/DL
PT BLD: 11.6 SEC
TSH SERPL-ACNC: 2.35 UIU/ML

## 2021-10-01 NOTE — DISCUSSION/SUMMARY
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  Continue present settings.  Patient with demonstrated clinical benefit with daytime and nocturnal symptomatology improvement.\par With no evidence for chronic cough will continue lisinopril-inc 10 mg\par JaN 2018 Colonoscopy\par Labs  fax DERM Sheila Cheney MD Fax #R [phone]\par NEURO Dr Francis\par  TDAP\par \par Patient is  medically and pulmonary  cleared for Oral tongue Bx surgery

## 2021-10-01 NOTE — HISTORY OF PRESENT ILLNESS
[Never] : never [Stable] : are stable [Difficulty Breathing During Exertion] : denies dyspnea on exertion [Feelings Of Weakness On Exertion] : denies exercise intolerance [Cough] : denies coughing [Wheezing] : denies wheezing [Regional Soft Tissue Swelling Both Lower Extremities] : denies lower extremity edema [Chest Pain Or Discomfort] : denies chest pain [Fever] : denies fever [Obstructive Sleep Apnea] : obstructive sleep apnea [Date: ___] : Date of most recent diagnostic polysomnogram: [unfilled] [AHI: ___ per hour] : Apnea-hypopnea index:  [unfilled] per hour [TextBox_4] : CT neck soft tissue report was sent\par Right-sided thyroid nodule 1.3 cm\par Bilateral cervical brady metastatic disease level 2 concerning for possible extranodal extension of disease in a patient with a 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

## 2021-10-07 ENCOUNTER — APPOINTMENT (OUTPATIENT)
Dept: PULMONOLOGY | Facility: CLINIC | Age: 66
End: 2021-10-07

## 2021-10-08 ENCOUNTER — APPOINTMENT (OUTPATIENT)
Dept: NUCLEAR MEDICINE | Facility: IMAGING CENTER | Age: 66
End: 2021-10-08
Payer: COMMERCIAL

## 2021-10-08 ENCOUNTER — OUTPATIENT (OUTPATIENT)
Dept: OUTPATIENT SERVICES | Facility: HOSPITAL | Age: 66
LOS: 1 days | End: 2021-10-08
Payer: COMMERCIAL

## 2021-10-08 DIAGNOSIS — C01 MALIGNANT NEOPLASM OF BASE OF TONGUE: ICD-10-CM

## 2021-10-08 DIAGNOSIS — Z00.8 ENCOUNTER FOR OTHER GENERAL EXAMINATION: ICD-10-CM

## 2021-10-08 PROCEDURE — 78815 PET IMAGE W/CT SKULL-THIGH: CPT | Mod: 26,PI

## 2021-10-08 PROCEDURE — 78815 PET IMAGE W/CT SKULL-THIGH: CPT

## 2021-10-08 PROCEDURE — A9552: CPT

## 2021-10-25 ENCOUNTER — OUTPATIENT (OUTPATIENT)
Dept: OUTPATIENT SERVICES | Facility: HOSPITAL | Age: 66
LOS: 1 days | End: 2021-10-25
Payer: COMMERCIAL

## 2021-10-25 ENCOUNTER — APPOINTMENT (OUTPATIENT)
Dept: ULTRASOUND IMAGING | Facility: CLINIC | Age: 66
End: 2021-10-25
Payer: COMMERCIAL

## 2021-10-25 ENCOUNTER — RESULT REVIEW (OUTPATIENT)
Age: 66
End: 2021-10-25

## 2021-10-25 DIAGNOSIS — Z12.9 ENCOUNTER FOR SCREENING FOR MALIGNANT NEOPLASM, SITE UNSPECIFIED: ICD-10-CM

## 2021-10-25 DIAGNOSIS — Z00.8 ENCOUNTER FOR OTHER GENERAL EXAMINATION: ICD-10-CM

## 2021-10-25 PROCEDURE — 76536 US EXAM OF HEAD AND NECK: CPT | Mod: 26

## 2021-10-25 PROCEDURE — 76536 US EXAM OF HEAD AND NECK: CPT

## 2021-11-12 ENCOUNTER — RX RENEWAL (OUTPATIENT)
Age: 66
End: 2021-11-12

## 2021-11-12 RX ORDER — LISINOPRIL 10 MG/1
10 TABLET ORAL DAILY
Qty: 90 | Refills: 3 | Status: ACTIVE | COMMUNITY
Start: 2019-06-25 | End: 1900-01-01

## 2021-11-15 ENCOUNTER — RX RENEWAL (OUTPATIENT)
Age: 66
End: 2021-11-15

## 2021-11-16 ENCOUNTER — RX RENEWAL (OUTPATIENT)
Age: 66
End: 2021-11-16

## 2021-11-16 RX ORDER — TADALAFIL 5 MG/1
5 TABLET ORAL
Qty: 30 | Refills: 11 | Status: ACTIVE | COMMUNITY
Start: 2020-11-02 | End: 1900-01-01

## 2021-12-02 ENCOUNTER — NON-APPOINTMENT (OUTPATIENT)
Age: 66
End: 2021-12-02

## 2021-12-02 ENCOUNTER — APPOINTMENT (OUTPATIENT)
Dept: PULMONOLOGY | Facility: CLINIC | Age: 66
End: 2021-12-02
Payer: SELF-PAY

## 2021-12-02 PROCEDURE — 99442: CPT

## 2021-12-09 ENCOUNTER — APPOINTMENT (OUTPATIENT)
Dept: PULMONOLOGY | Facility: CLINIC | Age: 66
End: 2021-12-09
Payer: SELF-PAY

## 2021-12-09 PROCEDURE — 95800 SLP STDY UNATTENDED: CPT | Mod: 52

## 2021-12-10 PROCEDURE — 95800 SLP STDY UNATTENDED: CPT

## 2022-01-03 ENCOUNTER — APPOINTMENT (OUTPATIENT)
Dept: PULMONOLOGY | Facility: CLINIC | Age: 67
End: 2022-01-03
Payer: COMMERCIAL

## 2022-01-03 VITALS
TEMPERATURE: 97.6 F | OXYGEN SATURATION: 98 % | HEART RATE: 45 BPM | SYSTOLIC BLOOD PRESSURE: 124 MMHG | DIASTOLIC BLOOD PRESSURE: 74 MMHG

## 2022-01-03 DIAGNOSIS — I10 ESSENTIAL (PRIMARY) HYPERTENSION: ICD-10-CM

## 2022-01-03 PROCEDURE — 99213 OFFICE O/P EST LOW 20 MIN: CPT

## 2022-01-03 NOTE — DISCUSSION/SUMMARY
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  Continue present settings.  Patient with demonstrated clinical benefit with daytime and nocturnal symptomatology improvement.\par With no evidence for chronic cough will continue lisinopril-inc 10 mg\par JaN 2018 Colonoscopy\par Labs  fax DERM Sheila Cheney MD Fax #A [phone]\par NEURO Dr Francis\par  TDAP\par \par ORDER new RESMED [Time Spent: ___ minutes] : I have spent [unfilled] minutes with the patient on the telephone

## 2022-01-03 NOTE — PROCEDURE
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\par Chest x-ray PA lateral September 30, 2021\par Normal cardiac size\par Uncoiled aorta\par No parenchymal infiltrates pleural effusions or dominant pulmonary nodules\par No gross interval change compared to prior serial chest x-rays\par \par EKG 9/30/21\par  atrial rhythm\par LVH\par \par CT CHEST 3/25/21 \par clear lungs \par CA Ca++\par \par Cardiac Cath 1/2018\par nl CA\par \par PFT June 25, 2019\par Spirometry normal\par 32% response to bronchodilator at small airways\par Lung volumes normal with total lung capacity 97% predicted.\par Diffusion normal 108% of predicted.\par Hemoglobin 14.9\par CPAP data compliance review through June 24, 2019\par Usage 90%\par Hours greater than 6\par AHI 4.0\par Auto CPAP settings 6-20 cm H2O\par Chest x-ray PA lateral June 25, 2019\par Cardiac size normal\par Clear lung fields without evidence of parenchymal infiltrates pleural effusions dominant pulmonary nodules\par No pneumothorax\par Mediastinum hilum normal\par No interval change compared to a chest x-ray dating back to December 7, 2017\par Blood draw\par Data review 6/26/2019\par CBC White blood count 6.79 hemoglobin 14.8 hematocrit 45.8 platelet count 227,000\par TFTs normal\par Hemoglobin A1c 5.6 with mean plasma glucose 114\par Hepatitis C titer negative\par Lipid profile\par Cholesterol 170 HDL 42  triglycerides 137\par PSA 0.91\par Vitamin D 28.5\par Serum electrolytes normal\par Liver function testing normal range\par DEJAH negative\par \par Blood Draw\par

## 2022-01-03 NOTE — HISTORY OF PRESENT ILLNESS
[Never] : never [Stable] : are stable [Difficulty Breathing During Exertion] : denies dyspnea on exertion [Feelings Of Weakness On Exertion] : denies exercise intolerance [Cough] : denies coughing [Wheezing] : denies wheezing [Regional Soft Tissue Swelling Both Lower Extremities] : denies lower extremity edema [Chest Pain Or Discomfort] : denies chest pain [Fever] : denies fever [Date: ___] : Date of most recent diagnostic polysomnogram: [unfilled] [AHI: ___ per hour] : Apnea-hypopnea index:  [unfilled] per hour [Obstructive Sleep Apnea] : obstructive sleep apnea [TextBox_100] : 12/9-10/2021 [TextBox_108] : 7 [Wt Gain ___ Lbs] : no recent weight gain [Wt Loss ___ Lbs] : no recent weight loss [Oxygen] : the patient uses no supplemental oxygen [FreeTextEntry1] : Patient with obstructive sleep apnea\par Readdress the issue of the DreamStation recall\par He states he has had his CPAP device greater than 5 years\par Without use he does have sleep symptomatology\par Sleep study December 9 December 10, 2021\par Very mild positional obstructive sleep apnea\par AHI 4\par [place] score 11\par  AHI 7 on night #2 of study\par \par  Visit Dr Delgado - managed Lipid \par remains on CPAP\par \par history [facility] [employer]\par No current cough\par Colonoscopy up  to date Jan 2018\par

## 2023-02-23 ENCOUNTER — APPOINTMENT (OUTPATIENT)
Dept: PULMONOLOGY | Facility: CLINIC | Age: 68
End: 2023-02-23
Payer: MEDICARE

## 2023-02-23 VITALS
WEIGHT: 190 LBS | OXYGEN SATURATION: 99 % | DIASTOLIC BLOOD PRESSURE: 73 MMHG | HEART RATE: 56 BPM | SYSTOLIC BLOOD PRESSURE: 137 MMHG | BODY MASS INDEX: 25.77 KG/M2

## 2023-02-23 DIAGNOSIS — G47.33 OBSTRUCTIVE SLEEP APNEA (ADULT) (PEDIATRIC): ICD-10-CM

## 2023-02-23 DIAGNOSIS — Z99.89 OBSTRUCTIVE SLEEP APNEA (ADULT) (PEDIATRIC): ICD-10-CM

## 2023-02-23 LAB — POCT - HEMOGLOBIN (HGB), QUANTITATIVE, TRANSCUTANEOUS: 13.7

## 2023-02-23 PROCEDURE — 94010 BREATHING CAPACITY TEST: CPT

## 2023-02-23 PROCEDURE — 94727 GAS DIL/WSHOT DETER LNG VOL: CPT

## 2023-02-23 PROCEDURE — ZZZZZ: CPT

## 2023-02-23 PROCEDURE — 94729 DIFFUSING CAPACITY: CPT

## 2023-02-23 PROCEDURE — 99214 OFFICE O/P EST MOD 30 MIN: CPT | Mod: 25

## 2023-02-23 PROCEDURE — 88738 HGB QUANT TRANSCUTANEOUS: CPT

## 2023-02-23 NOTE — HISTORY OF PRESENT ILLNESS
[Stable] : are stable [Difficulty Breathing During Exertion] : denies dyspnea on exertion [Feelings Of Weakness On Exertion] : denies exercise intolerance [Cough] : denies coughing [Wheezing] : denies wheezing [Regional Soft Tissue Swelling Both Lower Extremities] : denies lower extremity edema [Chest Pain Or Discomfort] : denies chest pain [Fever] : denies fever [Obstructive Sleep Apnea] : obstructive sleep apnea [Date: ___] : Date of most recent diagnostic polysomnogram: [unfilled] [AHI: ___ per hour] : Apnea-hypopnea index:  [unfilled] per hour [TextBox_100] : 12/9-10/2021 [TextBox_108] : 7 [Wt Gain ___ Lbs] : no recent weight gain [Wt Loss ___ Lbs] : no recent weight loss [Oxygen] : the patient uses no supplemental oxygen [FreeTextEntry1] : Throat Cancer / tongue [facility]  states imaging up  to  date\par 100 lb weight loss\par tx completed\par RT chemo completed  with  Remission mild  swallowing difficulty \par request to D/C CPAP machine\par \par Patient with obstructive sleep apnea\par Readdress the issue of the DreamStation recall\par He states he has had his CPAP device greater than 5 years\par Without use he does have sleep symptomatology\par Sleep study December 9 December 10, 2021\par Very mild positional obstructive sleep apnea\par AHI 4\par [place] score 11\par  AHI 7 on night #2 of study\par \par  Visit Dr Delgado - managed Lipid \par remains on CPAP\par \par history [facility] [employer]\par No current cough\par Colonoscopy up  to date Jan 2018\par

## 2023-02-23 NOTE — DISCUSSION/SUMMARY
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  Continue present settings.  Patient with demonstrated clinical benefit with daytime and nocturnal symptomatology improvement.\par With no evidence for chronic cough will continue lisinopril-inc 10 mg\par JaN 2018 Colonoscopy\par Labs  fax DERM Sheila Cheney MD Fax #W  [phone]\par NEURO Dr Francis\par  TDAP\par \par ORDER new RESMED

## 2023-02-23 NOTE — PROCEDURE
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\par Chest x-ray PA lateral September 30, 2021\par Normal cardiac size\par Uncoiled aorta\par No parenchymal infiltrates pleural effusions or dominant pulmonary nodules\par No gross interval change compared to prior serial chest x-rays\par \par EKG 9/30/21\par  atrial rhythm\par LVH\par \par CT CHEST 3/25/21 \par clear lungs \par CA Ca++\par \par Cardiac Cath 1/2018\par nl CA\par \par PFT June 25, 2019\par Spirometry normal\par 32% response to bronchodilator at small airways\par Lung volumes normal with total lung capacity 97% predicted.\par Diffusion normal 108% of predicted.\par Hemoglobin 14.9\par CPAP data compliance review through June 24, 2019\par Usage 90%\par Hours greater than 6\par AHI 4.0\par Auto CPAP settings 6-20 cm H2O\par Chest x-ray PA lateral June 25, 2019\par Cardiac size normal\par Clear lung fields without evidence of parenchymal infiltrates pleural effusions dominant pulmonary nodules\par No pneumothorax\par Mediastinum hilum normal\par No interval change compared to a chest x-ray dating back to December 7, 2017\par Blood draw\par Data review 6/26/2019\par CBC White blood count 6.79 hemoglobin 14.8 hematocrit 45.8 platelet count 227,000\par TFTs normal\par Hemoglobin A1c 5.6 with mean plasma glucose 114\par Hepatitis C titer negative\par Lipid profile\par Cholesterol 170 HDL 42  triglycerides 137\par PSA 0.91\par Vitamin D 28.5\par Serum electrolytes normal\par Liver function testing normal range\par DEJAH negative\par \par Blood Draw\par

## 2023-11-16 ENCOUNTER — NON-APPOINTMENT (OUTPATIENT)
Age: 68
End: 2023-11-16

## 2024-01-04 ENCOUNTER — NON-APPOINTMENT (OUTPATIENT)
Age: 69
End: 2024-01-04

## 2024-10-05 ENCOUNTER — NON-APPOINTMENT (OUTPATIENT)
Age: 69
End: 2024-10-05

## 2024-12-31 ENCOUNTER — NON-APPOINTMENT (OUTPATIENT)
Age: 69
End: 2024-12-31

## 2025-06-28 ENCOUNTER — NON-APPOINTMENT (OUTPATIENT)
Age: 70
End: 2025-06-28